# Patient Record
Sex: MALE | Race: AMERICAN INDIAN OR ALASKA NATIVE
[De-identification: names, ages, dates, MRNs, and addresses within clinical notes are randomized per-mention and may not be internally consistent; named-entity substitution may affect disease eponyms.]

---

## 2018-01-25 ENCOUNTER — HOSPITAL ENCOUNTER (EMERGENCY)
Dept: HOSPITAL 5 - ED | Age: 53
Discharge: LEFT BEFORE DISCHARGE | End: 2018-01-25
Payer: MEDICAID

## 2018-01-25 VITALS — SYSTOLIC BLOOD PRESSURE: 120 MMHG | DIASTOLIC BLOOD PRESSURE: 76 MMHG

## 2018-01-25 DIAGNOSIS — R05: Primary | ICD-10-CM

## 2018-01-25 DIAGNOSIS — Z53.21: ICD-10-CM

## 2019-05-30 ENCOUNTER — HOSPITAL ENCOUNTER (OUTPATIENT)
Dept: HOSPITAL 5 - GIO | Age: 54
Discharge: HOME | End: 2019-05-30
Attending: INTERNAL MEDICINE
Payer: MEDICAID

## 2019-05-30 VITALS — DIASTOLIC BLOOD PRESSURE: 88 MMHG | SYSTOLIC BLOOD PRESSURE: 162 MMHG

## 2019-05-30 DIAGNOSIS — Z98.890: ICD-10-CM

## 2019-05-30 DIAGNOSIS — Z79.899: ICD-10-CM

## 2019-05-30 DIAGNOSIS — Z91.81: ICD-10-CM

## 2019-05-30 DIAGNOSIS — F32.9: ICD-10-CM

## 2019-05-30 DIAGNOSIS — Z12.11: Primary | ICD-10-CM

## 2019-05-30 DIAGNOSIS — I25.10: ICD-10-CM

## 2019-05-30 DIAGNOSIS — Z72.89: ICD-10-CM

## 2019-05-30 DIAGNOSIS — F41.9: ICD-10-CM

## 2019-05-30 DIAGNOSIS — Z87.891: ICD-10-CM

## 2019-05-30 DIAGNOSIS — E78.00: ICD-10-CM

## 2019-05-30 DIAGNOSIS — I50.9: ICD-10-CM

## 2019-05-30 DIAGNOSIS — K57.30: ICD-10-CM

## 2019-05-30 DIAGNOSIS — I11.0: ICD-10-CM

## 2019-05-30 DIAGNOSIS — K64.8: ICD-10-CM

## 2019-05-30 DIAGNOSIS — M19.90: ICD-10-CM

## 2019-05-30 DIAGNOSIS — K21.9: ICD-10-CM

## 2019-05-30 DIAGNOSIS — D12.3: ICD-10-CM

## 2019-05-30 DIAGNOSIS — B18.2: ICD-10-CM

## 2019-05-30 PROCEDURE — 45380 COLONOSCOPY AND BIOPSY: CPT

## 2019-05-30 PROCEDURE — 88305 TISSUE EXAM BY PATHOLOGIST: CPT

## 2019-08-27 ENCOUNTER — HOSPITAL ENCOUNTER (INPATIENT)
Dept: HOSPITAL 5 - ED | Age: 54
LOS: 2 days | Discharge: HOME | DRG: 391 | End: 2019-08-29
Attending: INTERNAL MEDICINE | Admitting: INTERNAL MEDICINE
Payer: MEDICAID

## 2019-08-27 DIAGNOSIS — Z82.49: ICD-10-CM

## 2019-08-27 DIAGNOSIS — K85.90: ICD-10-CM

## 2019-08-27 DIAGNOSIS — I50.9: ICD-10-CM

## 2019-08-27 DIAGNOSIS — B19.20: ICD-10-CM
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
## 2019-08-27 DIAGNOSIS — F10.129: ICD-10-CM

## 2019-08-27 DIAGNOSIS — F25.9: ICD-10-CM

## 2019-08-27 DIAGNOSIS — Z71.41: ICD-10-CM

## 2019-08-27 DIAGNOSIS — I25.10: ICD-10-CM

## 2019-08-27 DIAGNOSIS — Z87.891: ICD-10-CM

## 2019-08-27 DIAGNOSIS — Z21: ICD-10-CM

## 2019-08-27 DIAGNOSIS — Z86.11: ICD-10-CM

## 2019-08-27 DIAGNOSIS — E78.5: ICD-10-CM

## 2019-08-27 DIAGNOSIS — K21.9: Primary | ICD-10-CM

## 2019-08-27 DIAGNOSIS — Z95.5: ICD-10-CM

## 2019-08-27 DIAGNOSIS — I11.0: ICD-10-CM

## 2019-08-27 LAB
ALBUMIN SERPL-MCNC: 3.9 G/DL (ref 3.9–5)
ALT SERPL-CCNC: 103 UNITS/L (ref 7–56)
APTT BLD: 26.9 SEC. (ref 24.2–36.6)
BASOPHILS # (AUTO): 0.1 K/MM3 (ref 0–0.1)
BASOPHILS NFR BLD AUTO: 1.5 % (ref 0–1.8)
BILIRUB DIRECT SERPL-MCNC: < 0.2 MG/DL (ref 0–0.2)
BUN SERPL-MCNC: 24 MG/DL (ref 9–20)
BUN/CREAT SERPL: 34 %
CALCIUM SERPL-MCNC: 9.2 MG/DL (ref 8.4–10.2)
EOSINOPHIL # BLD AUTO: 0.4 K/MM3 (ref 0–0.4)
EOSINOPHIL NFR BLD AUTO: 6.4 % (ref 0–4.3)
HCT VFR BLD CALC: 40.9 % (ref 35.5–45.6)
HEMOLYSIS INDEX: 27
HGB BLD-MCNC: 14.3 GM/DL (ref 11.8–15.2)
INR PPP: 0.94 (ref 0.87–1.13)
LYMPHOCYTES # BLD AUTO: 2 K/MM3 (ref 1.2–5.4)
LYMPHOCYTES NFR BLD AUTO: 31.5 % (ref 13.4–35)
MCHC RBC AUTO-ENTMCNC: 35 % (ref 32–34)
MCV RBC AUTO: 94 FL (ref 84–94)
MONOCYTES # (AUTO): 0.9 K/MM3 (ref 0–0.8)
MONOCYTES % (AUTO): 14.2 % (ref 0–7.3)
PLATELET # BLD: 136 K/MM3 (ref 140–440)
RBC # BLD AUTO: 4.38 M/MM3 (ref 3.65–5.03)

## 2019-08-27 PROCEDURE — 83690 ASSAY OF LIPASE: CPT

## 2019-08-27 PROCEDURE — 80320 DRUG SCREEN QUANTALCOHOLS: CPT

## 2019-08-27 PROCEDURE — 81001 URINALYSIS AUTO W/SCOPE: CPT

## 2019-08-27 PROCEDURE — 78452 HT MUSCLE IMAGE SPECT MULT: CPT

## 2019-08-27 PROCEDURE — 80048 BASIC METABOLIC PNL TOTAL CA: CPT

## 2019-08-27 PROCEDURE — 36415 COLL VENOUS BLD VENIPUNCTURE: CPT

## 2019-08-27 PROCEDURE — 85610 PROTHROMBIN TIME: CPT

## 2019-08-27 PROCEDURE — 93017 CV STRESS TEST TRACING ONLY: CPT

## 2019-08-27 PROCEDURE — 84484 ASSAY OF TROPONIN QUANT: CPT

## 2019-08-27 PROCEDURE — 85007 BL SMEAR W/DIFF WBC COUNT: CPT

## 2019-08-27 PROCEDURE — 93005 ELECTROCARDIOGRAM TRACING: CPT

## 2019-08-27 PROCEDURE — 99406 BEHAV CHNG SMOKING 3-10 MIN: CPT

## 2019-08-27 PROCEDURE — 93306 TTE W/DOPPLER COMPLETE: CPT

## 2019-08-27 PROCEDURE — A9502 TC99M TETROFOSMIN: HCPCS

## 2019-08-27 PROCEDURE — 80076 HEPATIC FUNCTION PANEL: CPT

## 2019-08-27 PROCEDURE — 85730 THROMBOPLASTIN TIME PARTIAL: CPT

## 2019-08-27 PROCEDURE — G0480 DRUG TEST DEF 1-7 CLASSES: HCPCS

## 2019-08-27 PROCEDURE — 93010 ELECTROCARDIOGRAM REPORT: CPT

## 2019-08-27 PROCEDURE — 85025 COMPLETE CBC W/AUTO DIFF WBC: CPT

## 2019-08-27 PROCEDURE — G0378 HOSPITAL OBSERVATION PER HR: HCPCS

## 2019-08-27 PROCEDURE — 71045 X-RAY EXAM CHEST 1 VIEW: CPT

## 2019-08-27 PROCEDURE — 80307 DRUG TEST PRSMV CHEM ANLYZR: CPT

## 2019-08-27 PROCEDURE — 74177 CT ABD & PELVIS W/CONTRAST: CPT

## 2019-08-28 LAB
BENZODIAZEPINES SCREEN,URINE: (no result)
BILIRUB UR QL STRIP: (no result)
BLOOD UR QL VISUAL: (no result)
METHADONE SCREEN,URINE: (no result)
MUCOUS THREADS #/AREA URNS HPF: (no result) /HPF
OPIATE SCREEN,URINE: (no result)
PH UR STRIP: 5 [PH] (ref 5–7)
PROT UR STRIP-MCNC: (no result) MG/DL
RBC #/AREA URNS HPF: 1 /HPF (ref 0–6)
UROBILINOGEN UR-MCNC: < 2 MG/DL (ref ?–2)
WBC #/AREA URNS HPF: < 1 /HPF (ref 0–6)

## 2019-08-28 RX ADMIN — AMLODIPINE BESYLATE SCH MG: 5 TABLET ORAL at 14:49

## 2019-08-28 RX ADMIN — SPIRONOLACTONE SCH MG: 25 TABLET ORAL at 15:00

## 2019-08-28 RX ADMIN — LORATADINE SCH MG: 10 TABLET ORAL at 14:49

## 2019-08-28 RX ADMIN — FOLIC ACID SCH MG: 1 TABLET ORAL at 22:10

## 2019-08-28 RX ADMIN — METOPROLOL TARTRATE SCH MG: 25 TABLET, FILM COATED ORAL at 14:49

## 2019-08-28 RX ADMIN — LOSARTAN POTASSIUM SCH MG: 50 TABLET, FILM COATED ORAL at 14:50

## 2019-08-28 RX ADMIN — AMLODIPINE BESYLATE SCH MG: 5 TABLET ORAL at 22:10

## 2019-08-28 RX ADMIN — Medication SCH ML: at 14:50

## 2019-08-28 RX ADMIN — HYDROXYZINE HYDROCHLORIDE SCH MG: 25 TABLET, FILM COATED ORAL at 14:49

## 2019-08-28 RX ADMIN — Medication SCH MG: at 22:10

## 2019-08-28 RX ADMIN — ENOXAPARIN SODIUM SCH MG: 100 INJECTION SUBCUTANEOUS at 14:50

## 2019-08-28 RX ADMIN — ASPIRIN SCH MG: 81 TABLET, CHEWABLE ORAL at 15:00

## 2019-08-28 RX ADMIN — Medication SCH ML: at 22:10

## 2019-08-29 VITALS — SYSTOLIC BLOOD PRESSURE: 127 MMHG | DIASTOLIC BLOOD PRESSURE: 82 MMHG

## 2019-08-29 LAB
BAND NEUTROPHILS # (MANUAL): 0 K/MM3
BUN SERPL-MCNC: 15 MG/DL (ref 9–20)
BUN/CREAT SERPL: 25 %
CALCIUM SERPL-MCNC: 8.9 MG/DL (ref 8.4–10.2)
HCT VFR BLD CALC: 41.1 % (ref 35.5–45.6)
HEMOLYSIS INDEX: 18
HGB BLD-MCNC: 13.7 GM/DL (ref 11.8–15.2)
MCHC RBC AUTO-ENTMCNC: 33 % (ref 32–34)
MCV RBC AUTO: 95 FL (ref 84–94)
MYELOCYTES # (MANUAL): 0 K/MM3
PLATELET # BLD: 113 K/MM3 (ref 140–440)
PROMYELOCYTES # (MANUAL): 0 K/MM3
RBC # BLD AUTO: 4.34 M/MM3 (ref 3.65–5.03)
TOTAL CELLS COUNTED BLD: 100

## 2019-08-29 RX ADMIN — SPIRONOLACTONE SCH MG: 25 TABLET ORAL at 09:52

## 2019-08-29 RX ADMIN — LOSARTAN POTASSIUM SCH MG: 50 TABLET, FILM COATED ORAL at 09:52

## 2019-08-29 RX ADMIN — Medication SCH ML: at 09:53

## 2019-08-29 RX ADMIN — ENOXAPARIN SODIUM SCH MG: 100 INJECTION SUBCUTANEOUS at 09:53

## 2019-08-29 RX ADMIN — ASPIRIN SCH MG: 81 TABLET, CHEWABLE ORAL at 09:52

## 2019-08-29 RX ADMIN — FOLIC ACID SCH MG: 1 TABLET ORAL at 09:52

## 2019-08-29 RX ADMIN — AMLODIPINE BESYLATE SCH MG: 5 TABLET ORAL at 09:52

## 2019-08-29 RX ADMIN — Medication SCH MG: at 09:52

## 2019-08-29 RX ADMIN — LORATADINE SCH MG: 10 TABLET ORAL at 09:52

## 2019-08-29 RX ADMIN — HYDROXYZINE HYDROCHLORIDE SCH MG: 25 TABLET, FILM COATED ORAL at 09:52

## 2019-08-29 RX ADMIN — METOPROLOL TARTRATE SCH: 25 TABLET, FILM COATED ORAL at 08:59

## 2021-03-16 ENCOUNTER — LAB OUTSIDE AN ENCOUNTER (OUTPATIENT)
Dept: URBAN - METROPOLITAN AREA CLINIC 109 | Facility: CLINIC | Age: 56
End: 2021-03-16

## 2021-03-16 ENCOUNTER — OFFICE VISIT (OUTPATIENT)
Dept: URBAN - METROPOLITAN AREA CLINIC 109 | Facility: CLINIC | Age: 56
End: 2021-03-16
Payer: COMMERCIAL

## 2021-03-16 ENCOUNTER — WEB ENCOUNTER (OUTPATIENT)
Dept: URBAN - METROPOLITAN AREA CLINIC 109 | Facility: CLINIC | Age: 56
End: 2021-03-16

## 2021-03-16 DIAGNOSIS — Z21 ASYMPTOMATIC HIV INFECTION, WITH NO HISTORY OF HIV-RELATED ILLNESS: ICD-10-CM

## 2021-03-16 DIAGNOSIS — B18.2 CHRONIC HEPATITIS C WITHOUT HEPATIC COMA: ICD-10-CM

## 2021-03-16 PROCEDURE — 99213 OFFICE O/P EST LOW 20 MIN: CPT | Performed by: INTERNAL MEDICINE

## 2021-03-16 RX ORDER — AMLODIPINE BESYLATE 5 MG/1
TABLET ORAL
Qty: 0 | Refills: 0 | Status: ACTIVE | COMMUNITY
Start: 1900-01-01

## 2021-03-16 RX ORDER — ABACAVIR SULFATE, DOLUTEGRAVIR SODIUM, LAMIVUDINE 600; 50; 300 MG/1; MG/1; MG/1
TAKE 1 TABLET BY ORAL ROUTE ONCE DAILY TABLET, FILM COATED ORAL 1
Qty: 0 | Refills: 0 | Status: ACTIVE | COMMUNITY
Start: 1900-01-01
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
## 2021-03-16 NOTE — PHYSICAL EXAM GASTROINTESTINAL
Abdomen , soft, nontender, nondistended , no guarding or rigidity , no masses palpable , normal bowel sounds , Liver and Spleen , no hepatomegaly present , no hepatosplenomegaly , liver nontender , spleen not palpable, long midline scar

## 2021-09-13 ENCOUNTER — TELEPHONE ENCOUNTER (OUTPATIENT)
Dept: URBAN - METROPOLITAN AREA CLINIC 94 | Facility: CLINIC | Age: 56
End: 2021-09-13

## 2021-09-16 ENCOUNTER — WEB ENCOUNTER (OUTPATIENT)
Dept: URBAN - METROPOLITAN AREA CLINIC 109 | Facility: CLINIC | Age: 56
End: 2021-09-16

## 2021-09-16 ENCOUNTER — DASHBOARD ENCOUNTERS (OUTPATIENT)
Age: 56
End: 2021-09-16

## 2021-09-16 ENCOUNTER — LAB OUTSIDE AN ENCOUNTER (OUTPATIENT)
Dept: URBAN - METROPOLITAN AREA CLINIC 109 | Facility: CLINIC | Age: 56
End: 2021-09-16

## 2021-09-16 ENCOUNTER — OFFICE VISIT (OUTPATIENT)
Dept: URBAN - METROPOLITAN AREA CLINIC 109 | Facility: CLINIC | Age: 56
End: 2021-09-16
Payer: COMMERCIAL

## 2021-09-16 DIAGNOSIS — K74.60 CIRRHOSIS OF LIVER WITHOUT ASCITES, UNSPECIFIED HEPATIC CIRRHOSIS TYPE: ICD-10-CM

## 2021-09-16 DIAGNOSIS — B18.2 CHRONIC HEPATITIS C WITHOUT HEPATIC COMA: ICD-10-CM

## 2021-09-16 DIAGNOSIS — Z21 ASYMPTOMATIC HIV INFECTION, WITH NO HISTORY OF HIV-RELATED ILLNESS: ICD-10-CM

## 2021-09-16 PROBLEM — 128302006: Status: ACTIVE | Noted: 2021-03-16

## 2021-09-16 LAB
A/G RATIO: 1
AFP, SERUM, TUMOR MARKER: 8.8
ALBUMIN: 4
ALKALINE PHOSPHATASE: 123
ALPHA 2-MACROGLOBULINS, QN: 369
ALT (SGPT) P5P: 109
ALT (SGPT): 83
APOLIPOPROTEIN A-1: 173
ASH GRADE: (no result)
ASH SCORE: 0.01
ASH SCORING: (no result)
AST (SGOT) P5P: 84
AST (SGOT): 74
BASO (ABSOLUTE): 0
BASOS: 1
BILIRUBIN, TOTAL: 0.8
BILIRUBIN, TOTAL: 0.9
BUN/CREATININE RATIO: 10
BUN: 9
CALCIUM: 9.2
CARBON DIOXIDE, TOTAL: 22
CHLORIDE: 104
CHOLESTEROL, TOTAL: 199
COMMENT:: (no result)
CREATININE: 0.91
EGFR IF AFRICN AM: 108
EGFR IF NONAFRICN AM: 93
EOS (ABSOLUTE): 0.2
EOS: 4
FIBROSIS SCORE: 0.89
FIBROSIS SCORING:: (no result)
FIBROSIS STAGE: (no result)
GGT: 693
GLOBULIN, TOTAL: 4
GLUCOSE, SERUM: 279
GLUCOSE: 276
HAPTOGLOBIN: 55
HEIGHT:: 65
HEMATOCRIT: 46.3
HEMATOLOGY COMMENTS:: (no result)
HEMOGLOBIN: 16
IMMATURE CELLS: (no result)
IMMATURE GRANS (ABS): 0
IMMATURE GRANULOCYTES: 1
INTERPRETATION:: (no result)
LIMITATIONS:: (no result)
LYMPHS (ABSOLUTE): 1.5
LYMPHS: 37
MCH: 32.6
MCHC: 34.6
MCV: 94
MONOCYTES(ABSOLUTE): 0.5
MONOCYTES: 13
NEUTROPHILS (ABSOLUTE): 1.8
NEUTROPHILS: 44
NRBC: (no result)
PLATELETS: 118
POTASSIUM: 3.8
PROTEIN, TOTAL: 8
RBC: 4.91
RDW: 12.9
SODIUM: 141
STEATOSIS GRADE: (no result)
STEATOSIS GRADING: (no result)
STEATOSIS SCORE: 0.87
TRIGLYCERIDES: 380
WBC: 4.1
WEIGHT:: 152

## 2021-09-16 PROCEDURE — 99213 OFFICE O/P EST LOW 20 MIN: CPT | Performed by: INTERNAL MEDICINE

## 2021-09-16 RX ORDER — ABACAVIR SULFATE, DOLUTEGRAVIR SODIUM, LAMIVUDINE 600; 50; 300 MG/1; MG/1; MG/1
TAKE 1 TABLET BY ORAL ROUTE ONCE DAILY TABLET, FILM COATED ORAL 1
Qty: 0 | Refills: 0 | Status: ACTIVE | COMMUNITY
Start: 1900-01-01

## 2021-09-16 RX ORDER — AMLODIPINE BESYLATE 5 MG/1
TABLET ORAL
Qty: 0 | Refills: 0 | Status: ACTIVE | COMMUNITY
Start: 1900-01-01

## 2021-09-16 NOTE — HPI-TODAY'S VISIT:
The patient has a history of Hep C and returns for f/u. He is under the care of an ID specialist, Dr. Keyanna Aldrich for both HIV and HCV.  He has not been treated yet for hep C as he has not yet seen her again in part because he has been missing appointments.  Recent f/u labs revealed  an AST of 75, ALT 83, Plt count 118K, Fibrosure test suggestive of F4 cirrhosis.  U/s revealed no focal lesion or fatty liver.  AFP was slightly above normal at 8.8.  Current ETOH intake described as 2 -3 beers per day.  Colonoscopy in 2019 revealed a small adenoma. 5 year f/u recommended.  He has been vacccinated for Covid.  PMH notable for HIV, CAD with hx MI, Hep C.

## 2021-10-22 ENCOUNTER — OFFICE VISIT (OUTPATIENT)
Dept: URBAN - METROPOLITAN AREA SURGERY CENTER 23 | Facility: SURGERY CENTER | Age: 56
End: 2021-10-22

## 2021-10-29 PROBLEM — 19943007: Status: ACTIVE | Noted: 2021-09-16

## 2021-11-11 ENCOUNTER — TELEPHONE ENCOUNTER (OUTPATIENT)
Dept: URBAN - METROPOLITAN AREA CLINIC 23 | Facility: CLINIC | Age: 56
End: 2021-11-11

## 2021-12-01 ENCOUNTER — OFFICE VISIT (OUTPATIENT)
Dept: URBAN - METROPOLITAN AREA SURGERY CENTER 23 | Facility: SURGERY CENTER | Age: 56
End: 2021-12-01

## 2021-12-01 RX ORDER — ABACAVIR SULFATE, DOLUTEGRAVIR SODIUM, LAMIVUDINE 600; 50; 300 MG/1; MG/1; MG/1
TAKE 1 TABLET BY ORAL ROUTE ONCE DAILY TABLET, FILM COATED ORAL 1
Qty: 0 | Refills: 0 | Status: ACTIVE | COMMUNITY
Start: 1900-01-01

## 2021-12-01 RX ORDER — AMLODIPINE BESYLATE 5 MG/1
TABLET ORAL
Qty: 0 | Refills: 0 | Status: ACTIVE | COMMUNITY
Start: 1900-01-01